# Patient Record
Sex: FEMALE | Race: WHITE
[De-identification: names, ages, dates, MRNs, and addresses within clinical notes are randomized per-mention and may not be internally consistent; named-entity substitution may affect disease eponyms.]

---

## 2017-02-10 ENCOUNTER — HOSPITAL ENCOUNTER (OUTPATIENT)
Dept: HOSPITAL 47 - RADMAMWWP | Age: 57
Discharge: HOME | End: 2017-02-10
Attending: FAMILY MEDICINE
Payer: MEDICARE

## 2017-02-10 DIAGNOSIS — Z12.31: Primary | ICD-10-CM

## 2017-02-10 PROCEDURE — 77063 BREAST TOMOSYNTHESIS BI: CPT

## 2017-02-14 NOTE — MM
Reason for exam: screening  (asymptomatic).

Last mammogram was performed 1 year and 1 month ago.



History:

Patient is postmenopausal.

2 excisional biopsies of both breasts.  Benign excisional biopsy of both breasts. 

2 excisional biopsies of the right breast.

Took hormonal contraceptives for 1 year beginning at age 20.  Took estrogen for 1 

year beginning at age 43.



Physical Findings:

A clinical breast exam by your physician is recommended on an annual basis and 

results should be correlated with mammographic findings.



MG 3D Screening Mammo W/Cad

Bilateral CC and MLO view(s) were taken.

Prior study comparison: January 20, 2016, bilateral MG 3d screening mammo w/cad.  

January 14, 2015, bilateral MG screening mammo w CAD.

There are scattered fibroglandular densities.  No significant changes when 

compared with prior studies.





ASSESSMENT: Benign, BI-RAD 2



RECOMMENDATION:

Routine screening mammogram of both breasts in 1 year.

## 2018-03-22 ENCOUNTER — HOSPITAL ENCOUNTER (OUTPATIENT)
Dept: HOSPITAL 47 - RADMAMWWP | Age: 58
Discharge: HOME | End: 2018-03-22
Attending: FAMILY MEDICINE
Payer: MEDICARE

## 2018-03-22 DIAGNOSIS — Z12.31: Primary | ICD-10-CM

## 2018-03-22 PROCEDURE — 77063 BREAST TOMOSYNTHESIS BI: CPT

## 2018-03-22 PROCEDURE — 77067 SCR MAMMO BI INCL CAD: CPT

## 2018-03-23 NOTE — MM
Reason for exam: screening  (asymptomatic).

Last mammogram was performed 1 year and 1 month ago.



History:

Patient is postmenopausal.

2 excisional biopsies of both breasts.  Benign excisional biopsy of both breasts. 

2 excisional biopsies of the right breast.

Took hormonal contraceptives for 1 year beginning at age 20.  Took estrogen for 1 

year beginning at age 43.



Physical Findings:

A clinical breast exam by your physician is recommended on an annual basis and 

results should be correlated with mammographic findings.



MG 3D Screening Mammo W/Cad

Bilateral CC and MLO view(s) were taken.

Prior study comparison: February 10, 2017, bilateral MG 3d screening mammo w/cad. 

January 20, 2016, bilateral MG 3d screening mammo w/cad.

There are scattered fibroglandular densities.

Finding: There are typically benign round coarse calcifications in the left breast

consistent with posterior position on MLO view, stable and right breast middle 

position stable.

No significant changes in finding since February 10, 2017 and January 20, 2016.





ASSESSMENT: Benign, BI-RAD 2



RECOMMENDATION:

Routine screening mammogram of both breasts in 1 year.

## 2019-04-22 ENCOUNTER — HOSPITAL ENCOUNTER (OUTPATIENT)
Dept: HOSPITAL 47 - RADMAMWWP | Age: 59
End: 2019-04-22
Attending: FAMILY MEDICINE
Payer: COMMERCIAL

## 2019-04-22 DIAGNOSIS — Z12.31: Primary | ICD-10-CM

## 2019-04-22 PROCEDURE — 77067 SCR MAMMO BI INCL CAD: CPT

## 2019-04-22 PROCEDURE — 77063 BREAST TOMOSYNTHESIS BI: CPT

## 2019-12-10 ENCOUNTER — HOSPITAL ENCOUNTER (OUTPATIENT)
Dept: HOSPITAL 47 - ORWHC2ENDO | Age: 59
Discharge: HOME | End: 2019-12-10
Attending: INTERNAL MEDICINE
Payer: MEDICARE

## 2019-12-10 VITALS — BODY MASS INDEX: 27.9 KG/M2

## 2019-12-10 VITALS — DIASTOLIC BLOOD PRESSURE: 78 MMHG | SYSTOLIC BLOOD PRESSURE: 135 MMHG | HEART RATE: 71 BPM

## 2019-12-10 VITALS — RESPIRATION RATE: 16 BRPM

## 2019-12-10 VITALS — TEMPERATURE: 98.1 F

## 2019-12-10 DIAGNOSIS — Z82.49: ICD-10-CM

## 2019-12-10 DIAGNOSIS — I10: ICD-10-CM

## 2019-12-10 DIAGNOSIS — Z90.710: ICD-10-CM

## 2019-12-10 DIAGNOSIS — Z88.2: ICD-10-CM

## 2019-12-10 DIAGNOSIS — Z98.1: ICD-10-CM

## 2019-12-10 DIAGNOSIS — Z88.5: ICD-10-CM

## 2019-12-10 DIAGNOSIS — Z90.49: ICD-10-CM

## 2019-12-10 DIAGNOSIS — K21.0: ICD-10-CM

## 2019-12-10 DIAGNOSIS — Z88.6: ICD-10-CM

## 2019-12-10 DIAGNOSIS — F17.210: ICD-10-CM

## 2019-12-10 DIAGNOSIS — K29.50: Primary | ICD-10-CM

## 2019-12-10 DIAGNOSIS — E78.5: ICD-10-CM

## 2019-12-10 DIAGNOSIS — Z79.899: ICD-10-CM

## 2019-12-10 PROCEDURE — 88305 TISSUE EXAM BY PATHOLOGIST: CPT

## 2019-12-10 PROCEDURE — 43239 EGD BIOPSY SINGLE/MULTIPLE: CPT

## 2019-12-10 NOTE — P.PCN
Date of Procedure: 12/10/19


Description of Procedure: 





BRIEF HISTORY: 


Patient is a 59-year-old female with symptoms of epigastric abdominal pain who 

presents for outpatient EGD for further evaluation.





PROCEDURE PERFORMED: 


Esophagogastroduodenoscopy with biopsy.





PREOPERATIVE DIAGNOSIS: 


Epigastric abdominal pain. 





ESTIMATED BLOOD LOSS: 


Minimal.





IV sedation per anesthesia. 





PROCEDURE: 


After informed consent was obtained, the patient  was brought into the endoscopy

unit. IV sedation was administered by Anesthesia under continuous monitoring. 

Initially the Olympus GIF-190 video endoscope was inserted into the mouth. 

Esophagus intubated without any difficulty. It was gradually advanced into the 

stomach and duodenum and carefully examined. The bulb and the second part of the

duodenum appeared normal, with biopsies taken to rule out celiac sprue. The 

scope at this time was withdrawn to the stomach, adequately insufflated with 

air, and upon careful examination, mucosa of the antrum, body, cardia and the 

fundus appeared normal, except for some mild scattered erythema in the antrum 

consistent with mild antritis/gastritis with biopsies of the antrum and body 

taken. The scope was then withdrawn into the esophagus. The GE junction was 

located at 36 cm from the incisors and biopsied to rule out reflux esophagitis .

The esophagus appeared normal,  With mid esophagus biopsies taken to rule out 

eosinophilic esophagitis. There were no erosions or ulcerations seen and the 

patient tolerated the procedure well. 





IMPRESSION: 


1.  Mild gastritis, biopsies of the antrum and body .


2.  Biopsies of the GE junction, midesophagus and duodenum.





RECOMMENDATIONS: 


The findings of this examination were discussed with the patient and her friend.

 Okay to resume diet.  Okay to resume medications.  Await pathology from 

biopsies.  Follow up in gastroenterology clinic as previously scheduled.